# Patient Record
Sex: MALE | Race: WHITE | Employment: FULL TIME | URBAN - METROPOLITAN AREA
[De-identification: names, ages, dates, MRNs, and addresses within clinical notes are randomized per-mention and may not be internally consistent; named-entity substitution may affect disease eponyms.]

---

## 2018-08-24 ENCOUNTER — HOSPITAL ENCOUNTER (EMERGENCY)
Age: 28
Discharge: HOME | End: 2018-08-24
Attending: EMERGENCY MEDICINE
Payer: COMMERCIAL

## 2018-08-24 VITALS
HEART RATE: 88 BPM | SYSTOLIC BLOOD PRESSURE: 130 MMHG | WEIGHT: 220 LBS | HEIGHT: 67 IN | BODY MASS INDEX: 34.53 KG/M2 | RESPIRATION RATE: 24 BRPM | DIASTOLIC BLOOD PRESSURE: 82 MMHG | OXYGEN SATURATION: 100 % | TEMPERATURE: 98.4 F

## 2018-08-24 DIAGNOSIS — R06.4 HYPERVENTILATION: Primary | ICD-10-CM

## 2018-08-24 DIAGNOSIS — F41.1 ANXIETY STATE: ICD-10-CM

## 2018-08-24 PROCEDURE — 99283 EMERGENCY DEPT VISIT LOW MDM: CPT

## 2018-08-24 RX ORDER — DIAZEPAM 10 MG/1
10 TABLET ORAL ONCE
COMMUNITY

## 2018-08-24 ASSESSMENT — PAIN DESCRIPTION - DESCRIPTORS: DESCRIPTORS: ACHING;THROBBING

## 2018-08-24 ASSESSMENT — PAIN DESCRIPTION - PAIN TYPE: TYPE: ACUTE PAIN

## 2018-08-24 ASSESSMENT — PAIN DESCRIPTION - LOCATION: LOCATION: EYE

## 2018-08-24 ASSESSMENT — PAIN DESCRIPTION - ORIENTATION: ORIENTATION: LEFT;RIGHT

## 2018-08-24 ASSESSMENT — PAIN DESCRIPTION - FREQUENCY: FREQUENCY: CONTINUOUS

## 2018-08-24 ASSESSMENT — PAIN SCALES - GENERAL: PAINLEVEL_OUTOF10: 6

## 2018-08-24 ASSESSMENT — PAIN DESCRIPTION - PROGRESSION: CLINICAL_PROGRESSION: GRADUALLY WORSENING

## 2018-08-24 ASSESSMENT — PAIN DESCRIPTION - ONSET: ONSET: UNABLE TO TELL

## 2018-08-24 NOTE — ED NOTES
NRB taken off, pt states he is feeling slightly better.   Nelly Yen, notified     Melanie James, SALLY  08/24/18 [id number]

## 2018-08-24 NOTE — ED NOTES
Pt hyperventilating, NRB applied with no O2. Pt advised to try to slow his breathing down.  Will continue to monitor     Latonya Wood RN  08/24/18 0875

## 2018-08-24 NOTE — ED NOTES
Discharge instructions reviewed with pt including diagnosis, follow up appointments, and S/S of when to return.   Pt verbalized understanding      Marbin Martinez RN  08/24/18 [id number]

## 2018-08-24 NOTE — ED PROVIDER NOTES
states that this is a panic attack and pt does no need any labs or EKG. States to have pt on non-rebreather mask without oxygen as pt is hyperventilating. Pt denies recent travel, chest pain, calf pain, swelling, hx of blood clots. Pt observed in ER and states that he started to feel better. Will discharge pt per Dr. Simon Cheng. Pt will follow-up on outpatient basis with PCP. Again, pt notes he wants to leave and is feeling better. Dr. Simon Cheng evaluated patient as well as performed their own history/physical examination and agrees with assessment and plan. Pt educated on need to return to ER if new or worsening symptoms. Pt told to follow-up with PCP. All questions answered. Pt agreeable to the plan. At this time the patient is without objective evidence of an acute process requiring hospitalization or inpatient management. They have remained hemodynamically stable throughout their entire ED visit and are stable for discharge with outpatient follow-up. The plan has been discussed in detail and they are aware of the specific conditions for emergent return, as well as the importance of follow-up. Counseling: The emergency provider has spoken with the patient and discussed todays results, in addition to providing specific details for the plan of care and counseling regarding the diagnosis and prognosis. Questions are answered at this time and they are agreeable with the plan. Assessment      1. Hyperventilation    2. Anxiety state      Plan   Discharge to home  Patient condition is good    New Medications     Discharge Medication List as of 8/24/2018  2:52 PM        Electronically signed by Sondra Erazo PA-C   DD: 8/24/18  **This report was transcribed using voice recognition software. Every effort was made to ensure accuracy; however, inadvertent computerized transcription errors may be present.   END OF ED PROVIDER NOTE        Pari Hobbs PA-C  08/24/18 2610
